# Patient Record
Sex: MALE | Race: WHITE | NOT HISPANIC OR LATINO | Employment: OTHER | URBAN - METROPOLITAN AREA
[De-identification: names, ages, dates, MRNs, and addresses within clinical notes are randomized per-mention and may not be internally consistent; named-entity substitution may affect disease eponyms.]

---

## 2017-03-28 ENCOUNTER — OFFICE VISIT (OUTPATIENT)
Dept: INTERNAL MEDICINE | Facility: CLINIC | Age: 62
End: 2017-03-28
Payer: MEDICARE

## 2017-03-28 ENCOUNTER — PATIENT MESSAGE (OUTPATIENT)
Dept: INTERNAL MEDICINE | Facility: CLINIC | Age: 62
End: 2017-03-28

## 2017-03-28 VITALS
HEART RATE: 74 BPM | WEIGHT: 257.06 LBS | OXYGEN SATURATION: 96 % | SYSTOLIC BLOOD PRESSURE: 126 MMHG | DIASTOLIC BLOOD PRESSURE: 76 MMHG | HEIGHT: 75 IN | TEMPERATURE: 98 F | BODY MASS INDEX: 31.96 KG/M2

## 2017-03-28 DIAGNOSIS — Z12.5 PROSTATE CANCER SCREENING: ICD-10-CM

## 2017-03-28 DIAGNOSIS — E78.00 HYPERCHOLESTEREMIA: ICD-10-CM

## 2017-03-28 DIAGNOSIS — M54.50 CHRONIC MIDLINE LOW BACK PAIN WITHOUT SCIATICA: Primary | ICD-10-CM

## 2017-03-28 DIAGNOSIS — R53.83 FATIGUE, UNSPECIFIED TYPE: ICD-10-CM

## 2017-03-28 DIAGNOSIS — Z00.00 ROUTINE CHECK-UP: ICD-10-CM

## 2017-03-28 DIAGNOSIS — G89.29 CHRONIC MIDLINE LOW BACK PAIN WITHOUT SCIATICA: Primary | ICD-10-CM

## 2017-03-28 PROCEDURE — 99999 PR PBB SHADOW E&M-EST. PATIENT-LVL III: CPT | Mod: PBBFAC,,, | Performed by: NURSE PRACTITIONER

## 2017-03-28 PROCEDURE — 99499 UNLISTED E&M SERVICE: CPT | Mod: S$GLB,,, | Performed by: NURSE PRACTITIONER

## 2017-03-28 PROCEDURE — 1160F RVW MEDS BY RX/DR IN RCRD: CPT | Mod: S$GLB,,, | Performed by: NURSE PRACTITIONER

## 2017-03-28 PROCEDURE — 99214 OFFICE O/P EST MOD 30 MIN: CPT | Mod: S$GLB,,, | Performed by: NURSE PRACTITIONER

## 2017-03-28 NOTE — MR AVS SNAPSHOT
[facility]  [id number] [place] [id number]  Phone: [phone]                  Juan F CORDOVA Head   3/28/2017 10:20 AM   Office Visit    Description:  Male : 1955   Provider:  Garret Daniel NP   Department:  [facility]           Reason for Visit     Follow-up           Diagnoses this Visit        Comments    Chronic midline low back pain without sciatica    -  Primary     Routine check-up         Prostate cancer screening         Fatigue, unspecified type         Hypercholesteremia                To Do List           Future Appointments        Provider Department Dept Phone    3/29/2017 9:10 AM LABORATORY, [address] [facility] [phone]    2017 10:20 AM Judah Mcdonald MD [facility] Internal Medicine [phone]      Goals (5 Years of Data)     None      [facility]     [facility] Nurse Care Line -  Assistance  Registered nurses in the [facility] provide clinical advisement, health education, appointment booking, and other advisory services.  Call for this free service at [phone].             Medications           Message regarding Medications     Verify the changes and/or additions to your medication regime listed below are the same as discussed with your clinician today.  If any of these changes or additions are incorrect, please notify your healthcare provider.        STOP taking these medications     hyoscyamine (LEVSIN/SL) 0.125 mg Subl Place 1 tablet (0.125 mg total) under the tongue daily as needed.    lurasidone 40 mg Tab tablet Take 80 mg by mouth once daily.           Verify that the below list of medications is an accurate representation of the medications you are currently taking.  If none reported, the list may be blank. If incorrect, please contact your healthcare provider. Carry this list with you in case of emergency.           Current Medications     clonazepam (KLONOPIN) 1 MG  "tablet 3 mg. 1.5 tablet Oral At bedtime    gabapentin (NEURONTIN) 800 MG tablet     oxycodone-acetaminophen  mg (PERCOCET)  mg per tablet Take 1 tablet by mouth every 4 (four) hours as needed.    trazodone (DESYREL) 150 MG tablet     ziprasidone (GEODON) 40 MG Cap Take 80 mg by mouth once daily. take with food    inhalation device (AEROCHAMBER PLUS FLOW-VU) Use as directed for inhalation.           Clinical Reference Information           Your Vitals Were     BP Pulse Temp Height Weight SpO2    126/76 74 97.7 °F (36.5 °C) (Tympanic) 6' 3" (1.905 m) 116.6 kg (257 lb 0.9 oz) 96%    BMI                32.13 kg/m2          Blood Pressure          Most Recent Value    BP  126/76      Allergies as of 3/28/2017     Aspirin      Immunizations Administered on Date of Encounter - 3/28/2017     None      Orders Placed During Today's Visit      Normal Orders This Visit    Ambulatory Referral to Neurosurgery     Future Labs/Procedures Expected by Expires    CBC auto differential  3/28/2017 5/27/2018    Comprehensive metabolic panel  3/28/2017 5/27/2018    Lipid panel  3/28/2017 5/27/2018    PSA, Screening  3/28/2017 (Approximate) 5/27/2018    Testosterone Panel  3/28/2017 (Approximate) 5/27/2018    TSH  3/28/2017 5/27/2018      Language Assistance Services     ATTENTION: Language assistance services are available, free of charge. Please call [phone].      ATENCIÓN: Si habla español, tiene a sheikh disposición servicios gratuitos de asistencia lingüística. Llame al [phone].     CHÚ Ý: N?u b?n nói Ti?ng Vi?t, có các d?ch v? h? tr? ngôn ng? mi?n phí dành cho b?n. G?i s? [phone].         [facility] complies with applicable Federal civil rights laws and does not discriminate on the basis of race, color, national origin, age, disability, or sex.        "

## 2017-03-29 ENCOUNTER — PATIENT MESSAGE (OUTPATIENT)
Dept: INTERNAL MEDICINE | Facility: CLINIC | Age: 62
End: 2017-03-29

## 2017-03-29 ENCOUNTER — LAB VISIT (OUTPATIENT)
Dept: LAB | Facility: HOSPITAL | Age: 62
End: 2017-03-29
Attending: INTERNAL MEDICINE
Payer: MEDICARE

## 2017-03-29 DIAGNOSIS — Z00.00 ROUTINE CHECK-UP: ICD-10-CM

## 2017-03-29 DIAGNOSIS — R53.83 FATIGUE, UNSPECIFIED TYPE: ICD-10-CM

## 2017-03-29 DIAGNOSIS — Z12.5 PROSTATE CANCER SCREENING: ICD-10-CM

## 2017-03-29 DIAGNOSIS — E78.00 HYPERCHOLESTEREMIA: ICD-10-CM

## 2017-03-29 LAB
ALBUMIN SERPL BCP-MCNC: 3.7 G/DL
ALP SERPL-CCNC: 98 U/L
ALT SERPL W/O P-5'-P-CCNC: 21 U/L
ANION GAP SERPL CALC-SCNC: 9 MMOL/L
AST SERPL-CCNC: 20 U/L
BASOPHILS # BLD AUTO: 0.03 K/UL
BASOPHILS NFR BLD: 0.6 %
BILIRUB SERPL-MCNC: 0.5 MG/DL
BUN SERPL-MCNC: 10 MG/DL
CALCIUM SERPL-MCNC: 9.3 MG/DL
CHLORIDE SERPL-SCNC: 104 MMOL/L
CHOLEST/HDLC SERPL: 4.8 {RATIO}
CO2 SERPL-SCNC: 29 MMOL/L
COMPLEXED PSA SERPL-MCNC: 0.39 NG/ML
CREAT SERPL-MCNC: 0.9 MG/DL
DIFFERENTIAL METHOD: NORMAL
EOSINOPHIL # BLD AUTO: 0.3 K/UL
EOSINOPHIL NFR BLD: 4.9 %
ERYTHROCYTE [DISTWIDTH] IN BLOOD BY AUTOMATED COUNT: 12.4 %
EST. GFR  (AFRICAN AMERICAN): >60 ML/MIN/1.73 M^2
EST. GFR  (NON AFRICAN AMERICAN): >60 ML/MIN/1.73 M^2
GLUCOSE SERPL-MCNC: 86 MG/DL
HCT VFR BLD AUTO: 43.8 %
HDL/CHOLESTEROL RATIO: 20.8 %
HDLC SERPL-MCNC: 197 MG/DL
HDLC SERPL-MCNC: 41 MG/DL
HGB BLD-MCNC: 14.9 G/DL
LDLC SERPL CALC-MCNC: 119.2 MG/DL
LYMPHOCYTES # BLD AUTO: 1.8 K/UL
LYMPHOCYTES NFR BLD: 34.2 %
MCH RBC QN AUTO: 30.7 PG
MCHC RBC AUTO-ENTMCNC: 34 %
MCV RBC AUTO: 90 FL
MONOCYTES # BLD AUTO: 0.4 K/UL
MONOCYTES NFR BLD: 7 %
NEUTROPHILS # BLD AUTO: 2.8 K/UL
NEUTROPHILS NFR BLD: 53.1 %
NONHDLC SERPL-MCNC: 156 MG/DL
PLATELET # BLD AUTO: 164 K/UL
PMV BLD AUTO: 12.1 FL
POTASSIUM SERPL-SCNC: 4.1 MMOL/L
PROT SERPL-MCNC: 6.9 G/DL
RBC # BLD AUTO: 4.86 M/UL
SODIUM SERPL-SCNC: 142 MMOL/L
TRIGL SERPL-MCNC: 184 MG/DL
TSH SERPL DL<=0.005 MIU/L-ACNC: 2 UIU/ML
WBC # BLD AUTO: 5.26 K/UL

## 2017-03-29 PROCEDURE — 36415 COLL VENOUS BLD VENIPUNCTURE: CPT

## 2017-03-29 PROCEDURE — 80053 COMPREHEN METABOLIC PANEL: CPT

## 2017-03-29 PROCEDURE — 84270 ASSAY OF SEX HORMONE GLOBUL: CPT

## 2017-03-29 PROCEDURE — 85025 COMPLETE CBC W/AUTO DIFF WBC: CPT

## 2017-03-29 PROCEDURE — 84443 ASSAY THYROID STIM HORMONE: CPT

## 2017-03-29 PROCEDURE — 84153 ASSAY OF PSA TOTAL: CPT

## 2017-03-29 PROCEDURE — 80061 LIPID PANEL: CPT

## 2017-03-29 RX ORDER — ESOMEPRAZOLE MAGNESIUM 40 MG/1
40 CAPSULE, DELAYED RELEASE ORAL
Qty: 30 CAPSULE | Refills: 2 | Status: SHIPPED | OUTPATIENT
Start: 2017-03-29 | End: 2017-10-24 | Stop reason: SDUPTHER

## 2017-03-29 NOTE — PROGRESS NOTES
"Subjective:      Patient ID: Juan F Flores is a 62 y.o. male.    Chief Complaint: Follow-up    HPI:  Patient states he has a hx of back surgery, is taking pain meds, not helping anymore.  He says he still needs some work done on his back, is looking for a referral for a neurosurgeon.  He would like to see Dr Mike at the [facility] in [place].  He is also due for a physical with his pcp, dr correa.  He is not fasting at this time    Past Medical History:   Diagnosis Date    Anxiety     Bipolar disorder     Chronic LBP     Depression     Difficulty urinating     DU (duodenal ulcer)     Irritable bowel syndrome        Past Surgical History:   Procedure Laterality Date    BACK SURGERY      BACK SURGERY      hernia      HERNIA REPAIR      LUMBAR FUSION      lyph node biopsy         Lab Results   Component Value Date    WBC 6.75 09/27/2016    HGB 14.9 09/27/2016    HCT 43.2 09/27/2016     09/27/2016    CHOL 158 08/09/2016    TRIG 176 (H) 08/09/2016    HDL 32 (L) 08/09/2016    ALT 18 08/09/2016    AST 29 08/09/2016     09/27/2016    K 4.3 09/27/2016     09/27/2016    CREATININE 0.9 09/27/2016    BUN 10 09/27/2016    CO2 25 09/27/2016    TSH 1.128 01/11/2016    PSA 0.37 01/11/2016    INR 1.0 09/27/2016       /76  Pulse 74  Temp 97.7 °F (36.5 °C) (Tympanic)   Ht 6' 3" (1.905 m)  Wt 116.6 kg (257 lb 0.9 oz)  SpO2 96%  BMI 32.13 kg/m2      Review of Systems   Constitutional: Negative for appetite change, chills, diaphoresis and fever.   HENT: Negative for congestion, ear pain, postnasal drip, rhinorrhea, sneezing, sore throat and trouble swallowing.    Eyes: Negative for photophobia, pain and visual disturbance.   Respiratory: Negative for apnea, cough, choking, chest tightness, shortness of breath and wheezing.    Cardiovascular: Negative for chest pain, palpitations and leg swelling.   Gastrointestinal: Negative for abdominal pain, constipation, diarrhea, nausea and " vomiting.   Genitourinary: Negative for decreased urine volume, difficulty urinating, dysuria, hematuria and urgency.   Musculoskeletal: Positive for back pain. Negative for arthralgias, gait problem, joint swelling and myalgias.   Skin: Negative for rash.   Neurological: Negative for dizziness, tremors, seizures, syncope, weakness, light-headedness, numbness and headaches.   Psychiatric/Behavioral: Negative for agitation, confusion, decreased concentration, hallucinations and sleep disturbance. The patient is not nervous/anxious.       Objective:     Physical Exam   Constitutional: He is oriented to person, place, and time. He appears well-developed and well-nourished. No distress.   Musculoskeletal:   Normal gait   Neurological: He is alert and oriented to person, place, and time.   Skin: Skin is warm and dry.   Psychiatric: He has a normal mood and affect. His behavior is normal.     Assessment:      1. Chronic midline low back pain without sciatica    2. Routine check-up    3. Prostate cancer screening    4. Fatigue, unspecified type    5. Hypercholesteremia      Plan:   Chronic midline low back pain without sciatica  -     Ambulatory Referral to Neurosurgery    Routine check-up  -     TSH; Future; Expected date: 3/28/17  -     CBC auto differential; Future; Expected date: 3/28/17  -     Lipid panel; Future; Expected date: 3/28/17  -     Comprehensive metabolic panel; Future; Expected date: 3/28/17  -     PSA, Screening; Future; Expected date: 3/28/17    Prostate cancer screening  -     PSA, Screening; Future; Expected date: 3/28/17    Fatigue, unspecified type  -     TSH; Future; Expected date: 3/28/17  -     CBC auto differential; Future; Expected date: 3/28/17  -     Testosterone Panel; Future; Expected date: 3/28/17    Hypercholesteremia  -     TSH; Future; Expected date: 3/28/17  -     Lipid panel; Future; Expected date: 3/28/17    referral sent in to neuromedical.   Will see dr correa for labs and a  physical      Current Outpatient Prescriptions:     clonazepam (KLONOPIN) 1 MG tablet, 3 mg. 1.5 tablet Oral At bedtime, Disp: , Rfl:     gabapentin (NEURONTIN) 800 MG tablet, , Disp: , Rfl: 0    oxycodone-acetaminophen  mg (PERCOCET)  mg per tablet, Take 1 tablet by mouth every 4 (four) hours as needed., Disp: , Rfl:     trazodone (DESYREL) 150 MG tablet, , Disp: , Rfl: 0    ziprasidone (GEODON) 40 MG Cap, Take 80 mg by mouth once daily. take with food, Disp: , Rfl: 0    inhalation device (AEROCHAMBER PLUS FLOW-VU), Use as directed for inhalation., Disp: 1 Device, Rfl: 0

## 2017-04-02 LAB
ALBUMIN SERPL-MCNC: 4.3 G/DL (ref 3.6–5.1)
SHBG SERPL-SCNC: 79 NMOL/L (ref 22–77)
TESTOST FREE SERPL-MCNC: 13.7 PG/ML (ref 46–224)
TESTOST SERPL-MCNC: 234 NG/DL (ref 250–1100)
TESTOSTERONE.FREE+WB SERPL-MCNC: 27 NG/DL (ref 110–575)

## 2017-04-13 ENCOUNTER — OFFICE VISIT (OUTPATIENT)
Dept: INTERNAL MEDICINE | Facility: CLINIC | Age: 62
End: 2017-04-13
Payer: MEDICARE

## 2017-04-13 VITALS
OXYGEN SATURATION: 96 % | WEIGHT: 263.25 LBS | HEART RATE: 90 BPM | SYSTOLIC BLOOD PRESSURE: 102 MMHG | HEIGHT: 75 IN | TEMPERATURE: 96 F | DIASTOLIC BLOOD PRESSURE: 74 MMHG | BODY MASS INDEX: 32.73 KG/M2

## 2017-04-13 DIAGNOSIS — F31.9 BIPOLAR DEPRESSION: ICD-10-CM

## 2017-04-13 DIAGNOSIS — E29.1 HYPOGONADISM MALE: ICD-10-CM

## 2017-04-13 DIAGNOSIS — F41.9 ANXIETY: ICD-10-CM

## 2017-04-13 DIAGNOSIS — Z00.00 ROUTINE GENERAL MEDICAL EXAMINATION AT A HEALTH CARE FACILITY: Primary | ICD-10-CM

## 2017-04-13 PROCEDURE — 96372 THER/PROPH/DIAG INJ SC/IM: CPT | Mod: S$GLB,,, | Performed by: INTERNAL MEDICINE

## 2017-04-13 PROCEDURE — 99396 PREV VISIT EST AGE 40-64: CPT | Mod: 25,S$GLB,, | Performed by: INTERNAL MEDICINE

## 2017-04-13 PROCEDURE — 99999 PR PBB SHADOW E&M-EST. PATIENT-LVL III: CPT | Mod: PBBFAC,,, | Performed by: INTERNAL MEDICINE

## 2017-04-13 PROCEDURE — 99499 UNLISTED E&M SERVICE: CPT | Mod: S$GLB,,, | Performed by: INTERNAL MEDICINE

## 2017-04-13 RX ORDER — TESTOSTERONE CYPIONATE 200 MG/ML
100 INJECTION, SOLUTION INTRAMUSCULAR
Status: DISCONTINUED | OUTPATIENT
Start: 2017-04-13 | End: 2017-04-27

## 2017-04-13 RX ORDER — MELOXICAM 7.5 MG/1
7.5 TABLET ORAL DAILY
COMMUNITY
End: 2017-07-14 | Stop reason: SDUPTHER

## 2017-04-13 RX ADMIN — TESTOSTERONE CYPIONATE 100 MG: 200 INJECTION, SOLUTION INTRAMUSCULAR at 10:04

## 2017-04-13 NOTE — MR AVS SNAPSHOT
[facility] Internal Medicine  [address] [facility] [address]  Phone: [phone]                  Juan F CORDOVA Head   2017 10:20 AM   Office Visit    Description:  Male : 1955   Provider:  Judah Mcdonald MD   Department:  [facility]           Reason for Visit     Annual Exam           Diagnoses this Visit        Comments    Routine general medical examination at a health care facility    -  Primary     Hypogonadism male         Bipolar depression         Anxiety                To Do List           Future Appointments        Provider Department Dept Phone    2017 3:50 PM INTERNAL MEDICINE NURSE, [facility] Internal [facility] [phone]    2017 3:00 PM Judah Mcdonald MD [facility] Internal [facility] [phone]      Goals (5 Years of Data)     None      [facility]     [facility] On Call Nurse Care Line -  Assistance  Unless otherwise directed by your provider, please contact [facility], our nurse care line that is available for  assistance.     Registered nurses in the [facility] On Call Center provide: appointment scheduling, clinical advisement, health education, and other advisory services.  Call: [phone] (toll free)               Medications           Message regarding Medications     Verify the changes and/or additions to your medication regime listed below are the same as discussed with your clinician today.  If any of these changes or additions are incorrect, please notify your healthcare provider.        These medications were administered today        Dose Freq    testosterone cypionate injection 100 mg 100 mg Every 14 days    Sig: Inject 0.5 mLs (100 mg total) into the muscle every 14 (fourteen) days.    Class: Normal    Route: Intramuscular           Verify that the below list of medications is an accurate representation of the medications you are currently taking.  If none reported, the list may be blank. If incorrect, please  "contact your healthcare provider. Carry this list with you in case of emergency.           Current Medications     clonazepam (KLONOPIN) 1 MG tablet 3 mg. 1.5 tablet Oral At bedtime    esomeprazole (NEXIUM) 40 MG capsule Take 1 capsule (40 mg total) by mouth before breakfast.    gabapentin (NEURONTIN) 800 MG tablet     inhalation device (AEROCHAMBER PLUS FLOW-VU) Use as directed for inhalation.    meloxicam (MOBIC) 7.5 MG tablet Take 7.5 mg by mouth once daily.    oxycodone-acetaminophen  mg (PERCOCET)  mg per tablet Take 1 tablet by mouth every 4 (four) hours as needed.    trazodone (DESYREL) 150 MG tablet     ziprasidone (GEODON) 40 MG Cap Take 80 mg by mouth once daily. take with food           Clinical Reference Information           Your Vitals Were     BP Pulse Temp Height Weight SpO2    102/74 90 96.1 °F (35.6 °C) (Tympanic) 6' 3" (1.905 m) 119.4 kg (263 lb 3.7 oz) 96%    BMI                32.9 kg/m2          Blood Pressure          Most Recent Value    BP  102/74      Allergies as of 4/13/2017     Aspirin      Immunizations Administered on Date of Encounter - 4/13/2017     None      Orders Placed During Today's Visit     Future Labs/Procedures Expected by Expires    Testosterone Panel  7/12/2017 (Approximate) 10/10/2017      Administrations This Visit     testosterone cypionate injection 100 mg     Admin Date Action Dose Route Administered By             04/13/2017 Given 100 mg Intramuscular Clementina Brown LPN                      Language Assistance Services     ATTENTION: Language assistance services are available, free of charge. Please call [phone].      ATENCIÓN: Si habla español, tiene a sheikh disposición servicios gratuitos de asistencia lingüística. Llame al [phone].     CHÚ Ý: N?u b?n nói Ti?ng Vi?t, có các d?ch v? h? tr? ngôn ng? mi?n phí dành cho b?n. G?i s? [phone].         [facility] complies with applicable Federal civil rights laws and does not " discriminate on the basis of race, color, national origin, age, disability, or sex.

## 2017-04-13 NOTE — PROGRESS NOTES
"HPI:  Patient is a 62-year-old man who comes in today for annual physical exam.  He complains of feeling fatigued, tired down.  He has a history of hypogonadism and 17.  Testosterone in the past but it's been many years since she's been on it.  He would like to restart.  He has no other particular complaints.  He does see a psychiatrist for depression and bipolar disorder.      Current MEDS: medcard review, verified and update  Allergies: Per the electronic medical record    Past Medical History:   Diagnosis Date    Anxiety     Bipolar disorder     Chronic LBP     Depression     Difficulty urinating     DU (duodenal ulcer)     Hypogonadism male     Irritable bowel syndrome        Past Surgical History:   Procedure Laterality Date    BACK SURGERY      BACK SURGERY      hernia      HERNIA REPAIR      LUMBAR FUSION      lyph node biopsy         SHx: per the electronic medical record    FHx: recorded in the electronic medical record    ROS:    denies any chest pains or shortness of breath. Denies any nausea, vomiting or diarrhea. Denies any fever, chills or sweats. Denies any change in weight, voice, stool, skin or hair. Denies any dysuria, dyspepsia or dysphagia. Denies any change in vision, hearing or headaches. Denies any swollen lymph nodes or loss of memory.    PE:  /74  Pulse 90  Temp 96.1 °F (35.6 °C) (Tympanic)   Ht 6' 3" (1.905 m)  Wt 119.4 kg (263 lb 3.7 oz)  SpO2 96%  BMI 32.9 kg/m2  Gen: Well-developed, well-nourished, male, in no acute distress, oriented x3  HEENT: neck is supple, no adenopathy, carotids 2+ equal without bruits, thyroid exam normal size without nodules.  CHEST: clear to auscultation and percussion  CVS: regular rate and rhythm without significant murmur, gallop, or rubs  ABD: soft, benign, no rebound no guarding, no distention.  Bowel sounds are normal.     nontender.  No palpable masses.  No organomegaly and no audible bruits.  RECTAL: Deferred  EXT: no clubbing, " cyanosis, or edema  LYMPH: no cervical, inguinal, or axillary adenopathy  FEET: no loss of sensation.  No ulcers or pressure sores.  NEURO: gait normal.  Cranial nerves II- XII intact. No nystagmus.  Speech normal.   Gross motor and sensory unremarkable.    Lab Results   Component Value Date    WBC 5.26 03/29/2017    HGB 14.9 03/29/2017    HCT 43.8 03/29/2017     03/29/2017    CHOL 197 03/29/2017    TRIG 184 (H) 03/29/2017    HDL 41 03/29/2017    ALT 21 03/29/2017    AST 20 03/29/2017     03/29/2017    K 4.1 03/29/2017     03/29/2017    CREATININE 0.9 03/29/2017    BUN 10 03/29/2017    CO2 29 03/29/2017    TSH 2.003 03/29/2017    PSA 0.39 03/29/2017    INR 1.0 09/27/2016    testosterone levels were extremely low    Impression:  Hypergonadism  Other medical problems below, stable  Patient Active Problem List   Diagnosis    DU (duodenal ulcer)    Irritable bowel syndrome    Anxiety    Depression    Chronic low back pain    Osteoarthritis    Bipolar depression    Hypogonadism male       Plan:   Orders Placed This Encounter    Testosterone Panel    testosterone cypionate injection 100 mg     He was started on double testosterone 100 mg every 2 weeks.  He'll have repeat testosterone levels in 3 months.

## 2017-04-13 NOTE — PROGRESS NOTES
Administered Depotestosterone 100mg/0.5 ml IM Left Ventrogluteal per Dr Mcdonald. Advised remain in lobby 15 min to monitor for adverse reaction. Discarded 0.25 ml Depotestosterone. Witnessed by Filomena Ann LPN. Tolerated all/TGD

## 2017-04-27 ENCOUNTER — TELEPHONE (OUTPATIENT)
Dept: INTERNAL MEDICINE | Facility: CLINIC | Age: 62
End: 2017-04-27

## 2017-04-27 ENCOUNTER — CLINICAL SUPPORT (OUTPATIENT)
Dept: INTERNAL MEDICINE | Facility: CLINIC | Age: 62
End: 2017-04-27
Payer: MEDICARE

## 2017-04-27 DIAGNOSIS — E29.1 HYPOGONADISM MALE: Primary | ICD-10-CM

## 2017-04-27 PROCEDURE — 96372 THER/PROPH/DIAG INJ SC/IM: CPT | Mod: S$GLB,,, | Performed by: INTERNAL MEDICINE

## 2017-04-27 RX ORDER — TESTOSTERONE CYPIONATE 1000 MG/10ML
100 INJECTION, SOLUTION INTRAMUSCULAR
Qty: 10 ML | Refills: 0 | Status: SHIPPED | OUTPATIENT
Start: 2017-04-27 | End: 2017-07-19 | Stop reason: SDUPTHER

## 2017-04-27 RX ORDER — SYRINGE W-NEEDLE,DISPOSAB,3 ML 25GX5/8"
SYRINGE, EMPTY DISPOSABLE MISCELLANEOUS
Qty: 10 SYRINGE | Refills: 1 | COMMUNITY
Start: 2017-04-27

## 2017-04-27 RX ADMIN — TESTOSTERONE CYPIONATE 100 MG: 200 INJECTION, SOLUTION INTRAMUSCULAR at 04:04

## 2017-04-27 NOTE — TELEPHONE ENCOUNTER
Pt here for testosterone  Injection.  Pt requests that his wife be shown how to give the injections.  Teaching given to pt's wife with return demonstrations by pt's wife.  Pt would like to have refill for medication sent to his pharmacy for home injections.  Please advise.

## 2017-04-27 NOTE — PROGRESS NOTES
Depotestosterone 100 mg/0.5ml IM left ventrogluteal.  0.5m/l wasted and witnessed by MIRI Christy.  Pt requested teaching for his wife to give him the injections.  Pt teaching given to his wife with return demonstration by his wife. Teaching on how to draw medication from vial,how to read syringe for the correct dose and to change the needle to give the injection.  Teaching to find the correct muscle to give the injection.  Pt's wife did return demonstration on finding the muscle for injection.  Teaching on how to push the air out of the syringe and cleaning of the injection site and injection of the medication.  Pt's wife gave return demonstration and gave the pt the injection.  Pt tolerated injection well.  Pt advised to wait in clinic 15 minutes to monitor for side effects.  Pt voiced understanding and tolerated injection well.  Information given to pt and wife on side effects and if any occur [facility].

## 2017-05-01 ENCOUNTER — PATIENT MESSAGE (OUTPATIENT)
Dept: INTERNAL MEDICINE | Facility: CLINIC | Age: 62
End: 2017-05-01

## 2017-05-02 NOTE — TELEPHONE ENCOUNTER
Called pt he is needing 40 needles and 20 syringes to administer the testosterone at home.  Please order.

## 2017-05-02 NOTE — TELEPHONE ENCOUNTER
----- Message from Rita Nguyen sent at 5/2/2017  8:07 AM CDT -----  Call pt at [phone]///Returning your call//valdo xiong

## 2017-05-03 RX ORDER — SYRINGE, DISPOSABLE, 3 ML
SYRINGE, EMPTY DISPOSABLE MISCELLANEOUS
Qty: 20 EACH | Refills: 1 | COMMUNITY
Start: 2017-05-03 | End: 2017-07-31 | Stop reason: SDUPTHER

## 2017-05-03 NOTE — TELEPHONE ENCOUNTER
----- Message from Lubna Dewey sent at 5/3/2017  9:14 AM CDT -----  Contact: [place] - [facility]  She would like for you to call her back regarding a medication.   This is all she would tell me.  Call her at [phone]. bah

## 2017-07-14 ENCOUNTER — OFFICE VISIT (OUTPATIENT)
Dept: INTERNAL MEDICINE | Facility: CLINIC | Age: 62
End: 2017-07-14
Payer: MEDICARE

## 2017-07-14 ENCOUNTER — LAB VISIT (OUTPATIENT)
Dept: LAB | Facility: HOSPITAL | Age: 62
End: 2017-07-14
Attending: INTERNAL MEDICINE
Payer: MEDICARE

## 2017-07-14 VITALS
BODY MASS INDEX: 31.33 KG/M2 | DIASTOLIC BLOOD PRESSURE: 80 MMHG | HEART RATE: 73 BPM | HEIGHT: 75 IN | SYSTOLIC BLOOD PRESSURE: 110 MMHG | WEIGHT: 252 LBS | TEMPERATURE: 99 F | OXYGEN SATURATION: 94 %

## 2017-07-14 DIAGNOSIS — Z00.00 ROUTINE GENERAL MEDICAL EXAMINATION AT A HEALTH CARE FACILITY: ICD-10-CM

## 2017-07-14 DIAGNOSIS — E29.1 HYPOGONADISM MALE: ICD-10-CM

## 2017-07-14 DIAGNOSIS — E29.1 HYPOGONADISM MALE: Primary | ICD-10-CM

## 2017-07-14 PROCEDURE — 84270 ASSAY OF SEX HORMONE GLOBUL: CPT

## 2017-07-14 PROCEDURE — 36415 COLL VENOUS BLD VENIPUNCTURE: CPT | Mod: PO

## 2017-07-14 PROCEDURE — 99213 OFFICE O/P EST LOW 20 MIN: CPT | Mod: S$GLB,,, | Performed by: INTERNAL MEDICINE

## 2017-07-14 PROCEDURE — 99999 PR PBB SHADOW E&M-EST. PATIENT-LVL III: CPT | Mod: PBBFAC,,, | Performed by: INTERNAL MEDICINE

## 2017-07-14 RX ORDER — MELOXICAM 7.5 MG/1
7.5 TABLET ORAL DAILY
Qty: 30 TABLET | Refills: 11 | Status: SHIPPED | OUTPATIENT
Start: 2017-07-14 | End: 2018-03-19

## 2017-07-14 NOTE — PROGRESS NOTES
"HPI:  Patient is a 62-year-old man who comes today for follow-up of his hypogonadism.  He's now been on testosterone shots about 3 months.  Unfortunately, he did not get his testosterone blood work done prior to this appointment.  His last shot was 8 days ago.    Current meds have been verified and updated per the EMR  Exam:/80   Pulse 73   Temp 99.4 °F (37.4 °C) (Tympanic)   Ht 6' 3" (1.905 m)   Wt 114.3 kg (251 lb 15.8 oz)   SpO2 (!) 94%   BMI 31.50 kg/m²   Exam deferred    Lab Results   Component Value Date    WBC 5.26 03/29/2017    HGB 14.9 03/29/2017    HCT 43.8 03/29/2017     03/29/2017    CHOL 197 03/29/2017    TRIG 184 (H) 03/29/2017    HDL 41 03/29/2017    ALT 21 03/29/2017    AST 20 03/29/2017     03/29/2017    K 4.1 03/29/2017     03/29/2017    CREATININE 0.9 03/29/2017    BUN 10 03/29/2017    CO2 29 03/29/2017    TSH 2.003 03/29/2017    PSA 0.39 03/29/2017    INR 1.0 09/27/2016       Impression:  Hypergonadism  Patient Active Problem List   Diagnosis    DU (duodenal ulcer)    Irritable bowel syndrome    Anxiety    Depression    Chronic low back pain    Osteoarthritis    Bipolar depression    Hypogonadism male       Plan:  Orders Placed This Encounter    Comprehensive metabolic panel    Testosterone Panel    CBC auto differential    meloxicam (MOBIC) 7.5 MG tablet     He'll get the testosterone levels done today.  He will see me again in 3 months with the above lab work.    "

## 2017-07-16 ENCOUNTER — PATIENT MESSAGE (OUTPATIENT)
Dept: INTERNAL MEDICINE | Facility: CLINIC | Age: 62
End: 2017-07-16

## 2017-07-19 ENCOUNTER — TELEPHONE (OUTPATIENT)
Dept: INTERNAL MEDICINE | Facility: CLINIC | Age: 62
End: 2017-07-19

## 2017-07-19 DIAGNOSIS — E29.1 HYPOGONADISM MALE: Primary | ICD-10-CM

## 2017-07-19 LAB
ALBUMIN SERPL-MCNC: 4.1 G/DL (ref 3.6–5.1)
SHBG SERPL-SCNC: 70 NMOL/L (ref 22–77)
TESTOST FREE SERPL-MCNC: 20.4 PG/ML (ref 46–224)
TESTOST SERPL-MCNC: 306 NG/DL (ref 250–1100)
TESTOSTERONE.FREE+WB SERPL-MCNC: 38.5 NG/DL (ref 110–575)

## 2017-07-19 RX ORDER — TESTOSTERONE CYPIONATE 1000 MG/10ML
200 INJECTION, SOLUTION INTRAMUSCULAR
Qty: 10 ML | Refills: 0
Start: 2017-07-19 | End: 2017-07-21 | Stop reason: SDUPTHER

## 2017-07-19 NOTE — TELEPHONE ENCOUNTER
Called pt informed him of medication change and repeat labs. Informed pt new prescription sent to his pharmacy. Pt voiced understanding.

## 2017-07-20 ENCOUNTER — PATIENT MESSAGE (OUTPATIENT)
Dept: INTERNAL MEDICINE | Facility: CLINIC | Age: 62
End: 2017-07-20

## 2017-07-21 RX ORDER — TESTOSTERONE CYPIONATE 1000 MG/10ML
200 INJECTION, SOLUTION INTRAMUSCULAR
Qty: 10 ML | Refills: 0 | Status: SHIPPED | OUTPATIENT
Start: 2017-07-21 | End: 2017-10-24

## 2017-07-30 ENCOUNTER — PATIENT MESSAGE (OUTPATIENT)
Dept: INTERNAL MEDICINE | Facility: CLINIC | Age: 62
End: 2017-07-30

## 2017-07-31 RX ORDER — SYRINGE, DISPOSABLE, 3 ML
SYRINGE, EMPTY DISPOSABLE MISCELLANEOUS
Qty: 20 EACH | Refills: 1 | COMMUNITY
Start: 2017-07-31

## 2017-10-16 ENCOUNTER — LAB VISIT (OUTPATIENT)
Dept: LAB | Facility: HOSPITAL | Age: 62
End: 2017-10-16
Attending: INTERNAL MEDICINE
Payer: MEDICARE

## 2017-10-16 DIAGNOSIS — Z00.00 ROUTINE GENERAL MEDICAL EXAMINATION AT A HEALTH CARE FACILITY: ICD-10-CM

## 2017-10-16 DIAGNOSIS — E29.1 HYPOGONADISM MALE: ICD-10-CM

## 2017-10-16 LAB
ALBUMIN SERPL BCP-MCNC: 3.8 G/DL
ALP SERPL-CCNC: 102 U/L
ALT SERPL W/O P-5'-P-CCNC: 21 U/L
ANION GAP SERPL CALC-SCNC: 5 MMOL/L
AST SERPL-CCNC: 20 U/L
BASOPHILS # BLD AUTO: 0.03 K/UL
BASOPHILS NFR BLD: 0.5 %
BILIRUB SERPL-MCNC: 0.7 MG/DL
BUN SERPL-MCNC: 12 MG/DL
CALCIUM SERPL-MCNC: 9.3 MG/DL
CHLORIDE SERPL-SCNC: 105 MMOL/L
CO2 SERPL-SCNC: 30 MMOL/L
CREAT SERPL-MCNC: 1 MG/DL
DIFFERENTIAL METHOD: ABNORMAL
EOSINOPHIL # BLD AUTO: 0.2 K/UL
EOSINOPHIL NFR BLD: 2.4 %
ERYTHROCYTE [DISTWIDTH] IN BLOOD BY AUTOMATED COUNT: 12.5 %
EST. GFR  (AFRICAN AMERICAN): >60 ML/MIN/1.73 M^2
EST. GFR  (NON AFRICAN AMERICAN): >60 ML/MIN/1.73 M^2
GLUCOSE SERPL-MCNC: 91 MG/DL
HCT VFR BLD AUTO: 53.6 %
HGB BLD-MCNC: 17.8 G/DL
IMM GRANULOCYTES # BLD AUTO: 0.01 K/UL
IMM GRANULOCYTES NFR BLD AUTO: 0.2 %
LYMPHOCYTES # BLD AUTO: 1.4 K/UL
LYMPHOCYTES NFR BLD: 21.4 %
MCH RBC QN AUTO: 31.3 PG
MCHC RBC AUTO-ENTMCNC: 33.2 G/DL
MCV RBC AUTO: 94 FL
MONOCYTES # BLD AUTO: 0.5 K/UL
MONOCYTES NFR BLD: 7.4 %
NEUTROPHILS # BLD AUTO: 4.5 K/UL
NEUTROPHILS NFR BLD: 68.1 %
NRBC BLD-RTO: 0 /100 WBC
PLATELET # BLD AUTO: 164 K/UL
PMV BLD AUTO: 12.4 FL
POTASSIUM SERPL-SCNC: 4.8 MMOL/L
PROT SERPL-MCNC: 7.1 G/DL
RBC # BLD AUTO: 5.69 M/UL
SODIUM SERPL-SCNC: 140 MMOL/L
WBC # BLD AUTO: 6.64 K/UL

## 2017-10-16 PROCEDURE — 84270 ASSAY OF SEX HORMONE GLOBUL: CPT

## 2017-10-16 PROCEDURE — 85025 COMPLETE CBC W/AUTO DIFF WBC: CPT

## 2017-10-16 PROCEDURE — 80053 COMPREHEN METABOLIC PANEL: CPT

## 2017-10-16 PROCEDURE — 36415 COLL VENOUS BLD VENIPUNCTURE: CPT | Mod: PO

## 2017-10-20 LAB
ALBUMIN SERPL-MCNC: 4.3 G/DL (ref 3.6–5.1)
SHBG SERPL-SCNC: 70 NMOL/L (ref 22–77)
TESTOST FREE SERPL-MCNC: 98.7 PG/ML (ref 46–224)
TESTOST SERPL-MCNC: 1224 NG/DL (ref 250–1100)
TESTOSTERONE.FREE+WB SERPL-MCNC: 194.4 NG/DL (ref 110–575)

## 2017-10-24 ENCOUNTER — OFFICE VISIT (OUTPATIENT)
Dept: INTERNAL MEDICINE | Facility: CLINIC | Age: 62
End: 2017-10-24
Payer: MEDICARE

## 2017-10-24 VITALS
TEMPERATURE: 98 F | BODY MASS INDEX: 30.48 KG/M2 | HEIGHT: 75 IN | SYSTOLIC BLOOD PRESSURE: 106 MMHG | OXYGEN SATURATION: 96 % | WEIGHT: 245.13 LBS | HEART RATE: 61 BPM | DIASTOLIC BLOOD PRESSURE: 68 MMHG

## 2017-10-24 DIAGNOSIS — G89.29 CHRONIC MIDLINE LOW BACK PAIN WITHOUT SCIATICA: ICD-10-CM

## 2017-10-24 DIAGNOSIS — E29.1 HYPOGONADISM MALE: Primary | ICD-10-CM

## 2017-10-24 DIAGNOSIS — M54.50 CHRONIC MIDLINE LOW BACK PAIN WITHOUT SCIATICA: ICD-10-CM

## 2017-10-24 DIAGNOSIS — F32.A DEPRESSION, UNSPECIFIED DEPRESSION TYPE: ICD-10-CM

## 2017-10-24 PROCEDURE — 99499 UNLISTED E&M SERVICE: CPT | Mod: S$GLB,,, | Performed by: INTERNAL MEDICINE

## 2017-10-24 PROCEDURE — G0008 ADMIN INFLUENZA VIRUS VAC: HCPCS | Mod: S$GLB,,, | Performed by: INTERNAL MEDICINE

## 2017-10-24 PROCEDURE — G0009 ADMIN PNEUMOCOCCAL VACCINE: HCPCS | Mod: S$GLB,,, | Performed by: INTERNAL MEDICINE

## 2017-10-24 PROCEDURE — 99999 PR PBB SHADOW E&M-EST. PATIENT-LVL III: CPT | Mod: PBBFAC,,, | Performed by: INTERNAL MEDICINE

## 2017-10-24 PROCEDURE — 99213 OFFICE O/P EST LOW 20 MIN: CPT | Mod: 25,S$GLB,, | Performed by: INTERNAL MEDICINE

## 2017-10-24 PROCEDURE — 90662 IIV NO PRSV INCREASED AG IM: CPT | Mod: S$GLB,,, | Performed by: INTERNAL MEDICINE

## 2017-10-24 PROCEDURE — 90670 PCV13 VACCINE IM: CPT | Mod: S$GLB,,, | Performed by: INTERNAL MEDICINE

## 2017-10-24 RX ORDER — ESOMEPRAZOLE MAGNESIUM 40 MG/1
40 CAPSULE, DELAYED RELEASE ORAL
Qty: 30 CAPSULE | Refills: 11 | Status: SHIPPED | OUTPATIENT
Start: 2017-10-24 | End: 2018-11-07 | Stop reason: SDUPTHER

## 2017-10-24 RX ORDER — CYCLOBENZAPRINE HCL 10 MG
10 TABLET ORAL 3 TIMES DAILY PRN
Qty: 90 TABLET | Refills: 3 | Status: SHIPPED | OUTPATIENT
Start: 2017-10-24 | End: 2017-10-30

## 2017-10-24 NOTE — PROGRESS NOTES
"HPI:  Patient is a 62-year-old man who comes in today for follow-up of his hypergonadism.  He's been on testosterone for about 3 months.  He states he has not felt any difference at all.  His testosterone levels were slightly elevated.  His hematocrit also had increased significantly.  He himself is decided to stop the medication.  Since is not doing anything for    Current meds have been verified and updated per the EMR  Exam:/68 (BP Location: Left arm)   Pulse 61   Temp 98.4 °F (36.9 °C) (Tympanic)   Ht 6' 3" (1.905 m)   Wt 111.2 kg (245 lb 2.4 oz)   SpO2 96%   BMI 30.64 kg/m²   Exam deferred    Lab Results   Component Value Date    WBC 6.64 10/16/2017    HGB 17.8 10/16/2017    HCT 53.6 10/16/2017     10/16/2017    CHOL 197 03/29/2017    TRIG 184 (H) 03/29/2017    HDL 41 03/29/2017    ALT 21 10/16/2017    AST 20 10/16/2017     10/16/2017    K 4.8 10/16/2017     10/16/2017    CREATININE 1.0 10/16/2017    BUN 12 10/16/2017    CO2 30 (H) 10/16/2017    TSH 2.003 03/29/2017    PSA 0.39 03/29/2017    INR 1.0 09/27/2016       Impression:  Multiple medical problems below, stable  Patient Active Problem List   Diagnosis    DU (duodenal ulcer)    Irritable bowel syndrome    Anxiety    Depression    Chronic low back pain    Osteoarthritis    Bipolar depression    Hypogonadism male       Plan:  Orders Placed This Encounter    (In Office Administered) Pneumococcal Conjugate Vaccine (13 Valent) (IM)    Influenza - High Dose (65+) (PF) (IM)    esomeprazole (NEXIUM) 40 MG capsule    cyclobenzaprine (FLEXERIL) 10 MG tablet     He was given Prevnar vaccine today.  He is also given high-dose influenza vaccine.  Patient will see me in 6 months for his regular physical.    "

## 2017-10-30 ENCOUNTER — PATIENT MESSAGE (OUTPATIENT)
Dept: INTERNAL MEDICINE | Facility: CLINIC | Age: 62
End: 2017-10-30

## 2017-10-30 RX ORDER — TIZANIDINE HYDROCHLORIDE 6 MG/1
6 CAPSULE, GELATIN COATED ORAL NIGHTLY
Qty: 30 CAPSULE | Refills: 5 | Status: SHIPPED | OUTPATIENT
Start: 2017-10-30 | End: 2017-11-09

## 2017-10-31 ENCOUNTER — PATIENT MESSAGE (OUTPATIENT)
Dept: INTERNAL MEDICINE | Facility: CLINIC | Age: 62
End: 2017-10-31

## 2017-10-31 NOTE — TELEPHONE ENCOUNTER
He was given tizanidine 6 mg not flexeril. He wants to know he can double the dose. Please advise.

## 2018-03-13 ENCOUNTER — PES CALL (OUTPATIENT)
Dept: ADMINISTRATIVE | Facility: CLINIC | Age: 63
End: 2018-03-13

## 2018-03-19 ENCOUNTER — OFFICE VISIT (OUTPATIENT)
Dept: INTERNAL MEDICINE | Facility: CLINIC | Age: 63
End: 2018-03-19
Payer: MEDICARE

## 2018-03-19 VITALS
OXYGEN SATURATION: 95 % | HEIGHT: 75 IN | HEART RATE: 70 BPM | DIASTOLIC BLOOD PRESSURE: 64 MMHG | BODY MASS INDEX: 28.75 KG/M2 | WEIGHT: 231.25 LBS | SYSTOLIC BLOOD PRESSURE: 118 MMHG | TEMPERATURE: 98 F

## 2018-03-19 DIAGNOSIS — F31.9 BIPOLAR DEPRESSION: ICD-10-CM

## 2018-03-19 DIAGNOSIS — K58.9 IRRITABLE BOWEL SYNDROME, UNSPECIFIED TYPE: ICD-10-CM

## 2018-03-19 DIAGNOSIS — M19.90 OSTEOARTHRITIS, UNSPECIFIED OSTEOARTHRITIS TYPE, UNSPECIFIED SITE: ICD-10-CM

## 2018-03-19 DIAGNOSIS — F33.41 RECURRENT MAJOR DEPRESSIVE DISORDER, IN PARTIAL REMISSION: ICD-10-CM

## 2018-03-19 DIAGNOSIS — I77.1 TORTUOUS AORTA: ICD-10-CM

## 2018-03-19 DIAGNOSIS — E29.1 HYPOGONADISM MALE: ICD-10-CM

## 2018-03-19 DIAGNOSIS — M54.50 CHRONIC MIDLINE LOW BACK PAIN WITHOUT SCIATICA: ICD-10-CM

## 2018-03-19 DIAGNOSIS — Z00.00 ENCOUNTER FOR PREVENTIVE HEALTH EXAMINATION: Primary | ICD-10-CM

## 2018-03-19 DIAGNOSIS — F41.9 ANXIETY: ICD-10-CM

## 2018-03-19 DIAGNOSIS — G89.29 CHRONIC MIDLINE LOW BACK PAIN WITHOUT SCIATICA: ICD-10-CM

## 2018-03-19 PROBLEM — F32.4 MAJOR DEPRESSION IN PARTIAL REMISSION: Status: ACTIVE | Noted: 2018-03-19

## 2018-03-19 PROCEDURE — 99499 UNLISTED E&M SERVICE: CPT | Mod: S$GLB,,, | Performed by: NURSE PRACTITIONER

## 2018-03-19 PROCEDURE — 99999 PR PBB SHADOW E&M-EST. PATIENT-LVL IV: CPT | Mod: PBBFAC,,, | Performed by: NURSE PRACTITIONER

## 2018-03-19 PROCEDURE — G0439 PPPS, SUBSEQ VISIT: HCPCS | Mod: S$GLB,,, | Performed by: NURSE PRACTITIONER

## 2018-03-19 RX ORDER — TIZANIDINE 4 MG/1
TABLET ORAL
COMMUNITY
Start: 2018-02-23 | End: 2018-11-07 | Stop reason: SDUPTHER

## 2018-03-19 NOTE — PATIENT INSTRUCTIONS
Counseling and Referral of Other Preventative  (Italic type indicates deductible and co-insurance are waived)    Patient Name: Juan F Head  Today's Date: 3/19/2018    Health Maintenance       Date Due Completion Date    Colonoscopy 04/10/2013 4/10/2003 (Done)    Override on 4/10/2003: Done (per [facility] records no recommendation noted.)    PROSTATE-SPECIFIC ANTIGEN 03/29/2018 3/29/2017    Lipid Panel 03/29/2018 3/29/2017    TETANUS VACCINE 09/12/2027 9/12/2017        No orders of the defined types were placed in this encounter.    The following information is provided to all patients.  This information is to help you find resources for any of the problems found today that may be affecting your health:                Living healthy guide: www.[facility].[place].gov      Understanding Diabetes: www.diabetes.org      Eating healthy: www.cdc.gov/healthyweight      [facility] home safety checklist: www.cdc.gov/steadi/patient.html      Agency on Aging: www.goea.[place].gov      Alcoholics anonymous (AA): www.aa.org      Physical Activity: www.darren.nih.gov/ba6bkgy      Tobacco use: www.quitwithusla.org

## 2018-03-19 NOTE — PROGRESS NOTES
"Juan F Flores presented for a  Medicare AWV and comprehensive Health Risk Assessment today. The following components were reviewed and updated:    · Medical history  · Family History  · Social history  · Allergies and Current Medications  · Health Risk Assessment  · Health Maintenance  · Care Team     ** See Completed Assessments for Annual Wellness Visit within the encounter summary.**       The following assessments were completed:  · Living Situation  · CAGE  · Depression Screening  · Timed Get Up and Go  · Whisper Test  · Cognitive Function Screening  · Nutrition Screening  · ADL Screening  · PAQ Screening    Vitals:    03/19/18 1257   BP: 118/64   BP Location: Left arm   Pulse: 70   Temp: 98.1 °F (36.7 °C)   TempSrc: Tympanic   SpO2: 95%   Weight: 104.9 kg (231 lb 4.2 oz)   Height: 6' 3" (1.905 m)     Body mass index is 28.91 kg/m².  Physical Exam   Constitutional: He is oriented to person, place, and time. He appears well-developed and well-nourished. No distress.   HENT:   Head: Normocephalic and atraumatic.   Nose: Nose normal.   Mouth/Throat: Oropharynx is clear and moist. No oropharyngeal exudate.   Eyes: Conjunctivae are normal.   Neck: Normal range of motion. Neck supple. No JVD present. No tracheal deviation present. No thyromegaly present.   No carotid bruits   Cardiovascular: Normal rate, regular rhythm, normal heart sounds and intact distal pulses.  Exam reveals no gallop and no friction rub.    No murmur heard.  Pulmonary/Chest: Effort normal and breath sounds normal. No respiratory distress. He has no wheezes. He has no rales. He exhibits no tenderness.   Abdominal: Soft. Bowel sounds are normal. He exhibits no distension and no mass. There is no tenderness. There is no rebound and no guarding. No hernia.   No abd bruits   Musculoskeletal: He exhibits no edema or tenderness.   Guarded gait  Limited ROM   Lymphadenopathy:     He has no cervical adenopathy.   Neurological: He is alert and oriented to " person, place, and time. No cranial nerve deficit.   Skin: Skin is warm and dry. He is not diaphoretic.   Psychiatric: He has a normal mood and affect. His behavior is normal.         Diagnoses and health risks identified today and associated recommendations/orders:    1. Encounter for preventive health examination  Screenings performed, as noted above.  Personal preventative testing needs reviewed.     2. Chronic midline low back pain without sciatica  Monitored/treated on meds, continue the same tx, stable, sees Dr Fermin Carrillo monthly    3. Bipolar depression  Monitored/treated on meds, continue the same tx, stable    4. Recurrent major depressive disorder, in partial remission  Monitored/treated on meds, continue the same tx, stable    5. Tortuous aorta  Monitored/treated on meds, continue the same tx, stable, CXR 2011    6. Hypogonadism male  Monitored/treated on meds, continue the same tx, stable,  Will discuss resuming testosterone treatment with pcp    7. Anxiety  Monitored/treated on meds, continue the same tx, stable    8. Osteoarthritis, unspecified osteoarthritis type, unspecified site  Monitored/treated on meds, continue the same tx, stable    9. Irritable bowel syndrome, unspecified type  Monitored/treated on meds, continue the same tx, stable,        Provided Juan F with a 5-10 year written screening schedule and personal prevention plan. Recommendations were developed using the USPSTF age appropriate recommendations. Education, counseling, and referrals were provided as needed. After Visit Summary printed and given to patient which includes a list of additional screenings\tests needed.    No Follow-up on file.    Garret Daniel NP

## 2018-03-19 NOTE — PROGRESS NOTES
"Subjective:      Patient ID: Juan F CORDOVA Head is a 63 y.o. male.    Chief Complaint: HRA    HPI:    Past Medical History:   Diagnosis Date    Anxiety     Bipolar disorder     Chronic LBP     Depression     Difficulty urinating     DU (duodenal ulcer)     Hypogonadism male     Irritable bowel syndrome        Past Surgical History:   Procedure Laterality Date    BACK SURGERY      BACK SURGERY      hernia      HERNIA REPAIR      LUMBAR FUSION      lyph node biopsy         Lab Results   Component Value Date    WBC 6.64 10/16/2017    HGB 17.8 10/16/2017    HCT 53.6 10/16/2017     10/16/2017    CHOL 197 03/29/2017    TRIG 184 (H) 03/29/2017    HDL 41 03/29/2017    ALT 21 10/16/2017    AST 20 10/16/2017     10/16/2017    K 4.8 10/16/2017     10/16/2017    CREATININE 1.0 10/16/2017    BUN 12 10/16/2017    CO2 30 (H) 10/16/2017    TSH 2.003 03/29/2017    PSA 0.39 03/29/2017    INR 1.0 09/27/2016       /64 (BP Location: Left arm)   Pulse 70   Temp 98.1 °F (36.7 °C) (Tympanic)   Ht 6' 3" (1.905 m)   Wt 104.9 kg (231 lb 4.2 oz)   SpO2 95%   BMI 28.91 kg/m²       Review of Systems   Objective:     Physical Exam  Assessment:      1. Encounter for preventive health examination    2. Chronic midline low back pain without sciatica    3. Bipolar depression    4. Recurrent major depressive disorder, in partial remission    5. Tortuous aorta    6. Hypogonadism male    7. Anxiety    8. Osteoarthritis, unspecified osteoarthritis type, unspecified site    9. Irritable bowel syndrome, unspecified type      Plan:   Encounter for preventive health examination    Chronic midline low back pain without sciatica    Bipolar depression    Recurrent major depressive disorder, in partial remission    Tortuous aorta    Hypogonadism male    Anxiety    Osteoarthritis, unspecified osteoarthritis type, unspecified site    Irritable bowel syndrome, unspecified type          Current Outpatient Prescriptions:     " "clonazepam (KLONOPIN) 1 MG tablet, 3 mg. 1.5 tablet Oral At bedtime, Disp: , Rfl:     esomeprazole (NEXIUM) 40 MG capsule, Take 1 capsule (40 mg total) by mouth before breakfast., Disp: 30 capsule, Rfl: 11    gabapentin (NEURONTIN) 800 MG tablet, , Disp: , Rfl: 0    needle, disp, 18 G (HYPODERMIC NEEDLES) 18 gauge x 1 1/2" Ndle, Use the 18 G x 1 1/2" needle to with draw medication from vial., Disp: 20 each, Rfl: 1    needle, disp, 21 G (HYPODERMIC NEEDLES) 21 gauge x 1 1/2" Ndle, Use the 21 G 21 x 1 1/2 needle to inject medication into deep muscle., Disp: 20 each, Rfl: 1    oxycodone-acetaminophen  mg (PERCOCET)  mg per tablet, Take 1 tablet by mouth every 4 (four) hours as needed., Disp: , Rfl:     syringe with needle (SYRINGE 3CC/84RV6-3/2") 3 mL 21 x 1 1/2" Syrg, Use for injections, Disp: 10 Syringe, Rfl: 1    syringe, disposable, 3 mL Syrg, As needed, Disp: 20 each, Rfl: 1    trazodone (DESYREL) 150 MG tablet, , Disp: , Rfl: 0    ziprasidone (GEODON) 40 MG Cap, Take 80 mg by mouth once daily. take with food, Disp: , Rfl: 0    tiZANidine (ZANAFLEX) 4 MG tablet, , Disp: , Rfl:   "

## 2018-03-20 ENCOUNTER — PATIENT OUTREACH (OUTPATIENT)
Dept: ADMINISTRATIVE | Facility: HOSPITAL | Age: 63
End: 2018-03-20

## 2018-03-20 ENCOUNTER — OFFICE VISIT (OUTPATIENT)
Dept: INTERNAL MEDICINE | Facility: CLINIC | Age: 63
End: 2018-03-20
Payer: MEDICARE

## 2018-03-20 VITALS
TEMPERATURE: 96 F | DIASTOLIC BLOOD PRESSURE: 68 MMHG | HEART RATE: 60 BPM | WEIGHT: 231.25 LBS | HEIGHT: 75 IN | SYSTOLIC BLOOD PRESSURE: 110 MMHG | OXYGEN SATURATION: 94 % | BODY MASS INDEX: 28.75 KG/M2

## 2018-03-20 DIAGNOSIS — F41.9 ANXIETY: ICD-10-CM

## 2018-03-20 DIAGNOSIS — Z12.5 SCREENING PSA (PROSTATE SPECIFIC ANTIGEN): ICD-10-CM

## 2018-03-20 DIAGNOSIS — Z00.00 ENCOUNTER FOR MEDICAL EXAMINATION TO ESTABLISH CARE: Primary | ICD-10-CM

## 2018-03-20 DIAGNOSIS — E66.3 OVERWEIGHT: ICD-10-CM

## 2018-03-20 DIAGNOSIS — Z79.899 ENCOUNTER FOR LONG-TERM CURRENT USE OF MEDICATION: ICD-10-CM

## 2018-03-20 DIAGNOSIS — Z12.11 SCREENING FOR COLON CANCER: ICD-10-CM

## 2018-03-20 DIAGNOSIS — G89.29 CHRONIC MIDLINE LOW BACK PAIN WITHOUT SCIATICA: ICD-10-CM

## 2018-03-20 DIAGNOSIS — E29.1 HYPOGONADISM MALE: ICD-10-CM

## 2018-03-20 DIAGNOSIS — M54.50 CHRONIC MIDLINE LOW BACK PAIN WITHOUT SCIATICA: ICD-10-CM

## 2018-03-20 DIAGNOSIS — Z13.220 SCREENING FOR HYPERLIPIDEMIA: ICD-10-CM

## 2018-03-20 PROCEDURE — 99999 PR PBB SHADOW E&M-EST. PATIENT-LVL III: CPT | Mod: PBBFAC,,, | Performed by: FAMILY MEDICINE

## 2018-03-20 PROCEDURE — 99396 PREV VISIT EST AGE 40-64: CPT | Mod: S$GLB,,, | Performed by: FAMILY MEDICINE

## 2018-03-20 NOTE — PROGRESS NOTES
Subjective:       Patient ID: Juan F Flores is a 63 y.o. male.    Chief Complaint: Establish Care    HPI     64 yo M    PMH   Low back pain - Dr. Fermin Carrillo  Bipolar depression - neuropsychologist - Dr. Garcia  HypoT  Anxiety  OA  IBS  H/O DVT - was on anti-coagulation for 5 years.     Non-smoker - 6 months while college  None alcohol  No drug    No longer working  Retired -  for [employer]    Presents to establish care and discuss Test use.  Finds without T supplementation has no sex drive, low energy level, and weight maintence.    5 mo ago it was 1224          Review of Systems   Constitutional: Negative for chills and fever.   HENT: Negative for trouble swallowing.    Eyes: Negative for visual disturbance.   Respiratory: Negative for shortness of breath.    Cardiovascular: Negative for chest pain.   Gastrointestinal: Positive for constipation. Negative for diarrhea.   Genitourinary: Negative for difficulty urinating.   Musculoskeletal: Negative for gait problem.   Skin: Negative for rash.   Neurological: Negative for dizziness and light-headedness.   Psychiatric/Behavioral: Negative for dysphoric mood.       Objective:       Vitals:    03/20/18 1134   BP: 110/68   Pulse: 60   Temp: (!) 95.8 °F (35.4 °C)       Physical Exam   Constitutional: He is oriented to person, place, and time. He appears well-developed and well-nourished. No distress.   HENT:   Head: Normocephalic and atraumatic.   Right Ear: Hearing, tympanic membrane, external ear and ear canal normal.   Left Ear: Hearing, tympanic membrane, external ear and ear canal normal.   Nose: Nose normal. Right sinus exhibits no maxillary sinus tenderness and no frontal sinus tenderness. Left sinus exhibits no maxillary sinus tenderness and no frontal sinus tenderness.   Mouth/Throat: Uvula is midline, oropharynx is clear and moist and mucous membranes are normal.   Eyes: Conjunctivae are normal. Right eye exhibits no discharge. Left eye  exhibits no discharge.   Neck: Trachea normal, normal range of motion and full passive range of motion without pain.   Cardiovascular: Normal rate, regular rhythm, normal heart sounds and intact distal pulses.    Pulmonary/Chest: Effort normal and breath sounds normal. No respiratory distress. He has no decreased breath sounds. He has no wheezes.   Abdominal: Soft. Normal appearance and bowel sounds are normal. He exhibits no distension and no mass. There is no tenderness. There is no guarding. No hernia.   Musculoskeletal: Normal range of motion. He exhibits no edema or deformity.   Lymphadenopathy:     He has no cervical adenopathy.   Neurological: He is alert and oriented to person, place, and time.   Skin: Skin is warm, dry and intact. No rash noted. No erythema. No pallor.   Psychiatric: He has a normal mood and affect. His speech is normal and behavior is normal. Thought content normal.       Assessment:       1. Encounter for medical examination to establish care    2. Hypogonadism male    3. Chronic midline low back pain without sciatica    4. Anxiety    5. Screening for colon cancer    6. Screening for hyperlipidemia    7. Encounter for long-term current use of medication        Plan:   Encounter for medical examination to establish care      Hypogonadism male  Previously on T  Will check T panel  Consider restarting - as he feels much improved.    Chronic midline low back pain without sciatica  Follows pain mngt  Discussed opioid likely cause of constipation and low T    Anxiety  Bipolar  Follows psych  Continue current meds.    Screening colon cancer Fit Kit  Screening Lipid panel     Noted Hgb/hct elevated when T was supra therapeutic  Will check CBC prior to onset of T      F/U pending labs.  Or 6 months.            No Follow-up on file.

## 2018-03-21 ENCOUNTER — APPOINTMENT (OUTPATIENT)
Dept: LAB | Facility: HOSPITAL | Age: 63
End: 2018-03-21
Attending: FAMILY MEDICINE
Payer: MEDICARE

## 2018-03-21 ENCOUNTER — PATIENT MESSAGE (OUTPATIENT)
Dept: INTERNAL MEDICINE | Facility: CLINIC | Age: 63
End: 2018-03-21

## 2018-03-21 DIAGNOSIS — E29.1 HYPOGONADISM MALE: ICD-10-CM

## 2018-03-21 DIAGNOSIS — Z79.899 ENCOUNTER FOR LONG-TERM CURRENT USE OF MEDICATION: ICD-10-CM

## 2018-03-21 DIAGNOSIS — Z13.220 SCREENING FOR HYPERLIPIDEMIA: ICD-10-CM

## 2018-03-21 DIAGNOSIS — Z12.5 SCREENING PSA (PROSTATE SPECIFIC ANTIGEN): ICD-10-CM

## 2018-03-21 DIAGNOSIS — E66.3 OVERWEIGHT: ICD-10-CM

## 2018-03-21 LAB
ALBUMIN SERPL BCP-MCNC: 3.9 G/DL
ALP SERPL-CCNC: 93 U/L
ALT SERPL W/O P-5'-P-CCNC: 20 U/L
ANION GAP SERPL CALC-SCNC: 10 MMOL/L
AST SERPL-CCNC: 24 U/L
BASOPHILS # BLD AUTO: 0.03 K/UL
BASOPHILS NFR BLD: 0.5 %
BILIRUB SERPL-MCNC: 0.5 MG/DL
BUN SERPL-MCNC: 17 MG/DL
CALCIUM SERPL-MCNC: 9.7 MG/DL
CHLORIDE SERPL-SCNC: 105 MMOL/L
CHOLEST SERPL-MCNC: 184 MG/DL
CHOLEST/HDLC SERPL: 4.1 {RATIO}
CO2 SERPL-SCNC: 27 MMOL/L
COMPLEXED PSA SERPL-MCNC: 0.34 NG/ML
CREAT SERPL-MCNC: 1 MG/DL
DIFFERENTIAL METHOD: ABNORMAL
EOSINOPHIL # BLD AUTO: 0.2 K/UL
EOSINOPHIL NFR BLD: 3.4 %
ERYTHROCYTE [DISTWIDTH] IN BLOOD BY AUTOMATED COUNT: 11.8 %
EST. GFR  (AFRICAN AMERICAN): >60 ML/MIN/1.73 M^2
EST. GFR  (NON AFRICAN AMERICAN): >60 ML/MIN/1.73 M^2
GLUCOSE SERPL-MCNC: 87 MG/DL
HCT VFR BLD AUTO: 48.4 %
HDLC SERPL-MCNC: 45 MG/DL
HDLC SERPL: 24.5 %
HGB BLD-MCNC: 16.4 G/DL
IMM GRANULOCYTES # BLD AUTO: 0.01 K/UL
IMM GRANULOCYTES NFR BLD AUTO: 0.2 %
LDLC SERPL CALC-MCNC: 108.6 MG/DL
LYMPHOCYTES # BLD AUTO: 1.7 K/UL
LYMPHOCYTES NFR BLD: 30.7 %
MCH RBC QN AUTO: 31.4 PG
MCHC RBC AUTO-ENTMCNC: 33.9 G/DL
MCV RBC AUTO: 93 FL
MONOCYTES # BLD AUTO: 0.3 K/UL
MONOCYTES NFR BLD: 6 %
NEUTROPHILS # BLD AUTO: 3.3 K/UL
NEUTROPHILS NFR BLD: 59.2 %
NONHDLC SERPL-MCNC: 139 MG/DL
NRBC BLD-RTO: 0 /100 WBC
PLATELET # BLD AUTO: 156 K/UL
PMV BLD AUTO: 11.8 FL
POTASSIUM SERPL-SCNC: 4.9 MMOL/L
PROT SERPL-MCNC: 6.8 G/DL
RBC # BLD AUTO: 5.23 M/UL
SODIUM SERPL-SCNC: 142 MMOL/L
TRIGL SERPL-MCNC: 152 MG/DL
WBC # BLD AUTO: 5.54 K/UL

## 2018-03-21 PROCEDURE — 80053 COMPREHEN METABOLIC PANEL: CPT

## 2018-03-21 PROCEDURE — 85025 COMPLETE CBC W/AUTO DIFF WBC: CPT

## 2018-03-21 PROCEDURE — 80061 LIPID PANEL: CPT

## 2018-03-21 PROCEDURE — 84153 ASSAY OF PSA TOTAL: CPT

## 2018-03-21 PROCEDURE — 36415 COLL VENOUS BLD VENIPUNCTURE: CPT

## 2018-03-21 PROCEDURE — 82040 ASSAY OF SERUM ALBUMIN: CPT

## 2018-03-25 LAB
ALBUMIN SERPL-MCNC: 4.2 G/DL (ref 3.6–5.1)
SHBG SERPL-SCNC: 89 NMOL/L (ref 22–77)
TESTOST FREE SERPL-MCNC: 27.9 PG/ML (ref 46–224)
TESTOST SERPL-MCNC: 506 NG/DL (ref 250–1100)
TESTOSTERONE.FREE+WB SERPL-MCNC: 53.7 NG/DL (ref 110–575)

## 2018-09-20 ENCOUNTER — OFFICE VISIT (OUTPATIENT)
Dept: INTERNAL MEDICINE | Facility: CLINIC | Age: 63
End: 2018-09-20
Payer: MEDICARE

## 2018-09-20 VITALS
WEIGHT: 221.81 LBS | RESPIRATION RATE: 20 BRPM | TEMPERATURE: 98 F | OXYGEN SATURATION: 98 % | DIASTOLIC BLOOD PRESSURE: 64 MMHG | SYSTOLIC BLOOD PRESSURE: 106 MMHG | BODY MASS INDEX: 27.58 KG/M2 | HEIGHT: 75 IN | HEART RATE: 60 BPM

## 2018-09-20 DIAGNOSIS — F31.9 BIPOLAR DEPRESSION: ICD-10-CM

## 2018-09-20 DIAGNOSIS — Z79.899 HIGH RISK MEDICATION USE: ICD-10-CM

## 2018-09-20 DIAGNOSIS — Z23 IMMUNIZATION DUE: Primary | ICD-10-CM

## 2018-09-20 DIAGNOSIS — F41.9 ANXIETY: ICD-10-CM

## 2018-09-20 PROCEDURE — 3008F BODY MASS INDEX DOCD: CPT | Mod: CPTII,,, | Performed by: FAMILY MEDICINE

## 2018-09-20 PROCEDURE — 99999 PR PBB SHADOW E&M-EST. PATIENT-LVL IV: CPT | Mod: PBBFAC,,, | Performed by: FAMILY MEDICINE

## 2018-09-20 PROCEDURE — 90662 IIV NO PRSV INCREASED AG IM: CPT | Mod: PBBFAC

## 2018-09-20 PROCEDURE — 99214 OFFICE O/P EST MOD 30 MIN: CPT | Mod: PBBFAC,25 | Performed by: FAMILY MEDICINE

## 2018-09-20 PROCEDURE — 99213 OFFICE O/P EST LOW 20 MIN: CPT | Mod: S$PBB,,, | Performed by: FAMILY MEDICINE

## 2018-09-20 NOTE — PROGRESS NOTES
Subjective:       Patient ID: Juan F Flores is a 63 y.o. male.    Chief Complaint: Follow-up (6mo f/u)    HPI     63    Low back pain - Dr. Fermin Carrillo - Still taking percocet  Chronic foot pain - failed plantar fasciotomy.  Bipolar depression - neuropsychologist - Dr. Garcia  Anxiety  High Risk Medication  HypoT  OA  IBS  H/O DVT - was on anti-coagulation for 5 years.   GERD    Presents for follow - up    Outside pain from back -   Doing well    Has lost 10 lbs  Dietary improvement.          Review of Systems   Constitutional: Negative for activity change and unexpected weight change.   HENT: Negative for hearing loss, rhinorrhea and trouble swallowing.    Eyes: Negative for discharge and visual disturbance.   Respiratory: Negative for chest tightness and wheezing.    Cardiovascular: Negative for chest pain and palpitations.   Gastrointestinal: Positive for constipation. Negative for blood in stool, diarrhea and vomiting.   Endocrine: Negative for polydipsia and polyuria.   Genitourinary: Negative for difficulty urinating, hematuria and urgency.   Musculoskeletal: Negative for arthralgias, joint swelling and neck pain.   Neurological: Negative for weakness and headaches.   Psychiatric/Behavioral: Positive for dysphoric mood. Negative for confusion.       Objective:       Vitals:    09/20/18 1142   BP: 106/64   Pulse: 60   Resp: 20   Temp: 97.8 °F (36.6 °C)       Physical Exam   Constitutional: He is oriented to person, place, and time. He appears well-developed and well-nourished. No distress.   HENT:   Head: Normocephalic and atraumatic.   Right Ear: Hearing, tympanic membrane, external ear and ear canal normal.   Left Ear: Hearing, tympanic membrane, external ear and ear canal normal.   Nose: Nose normal. Right sinus exhibits no maxillary sinus tenderness and no frontal sinus tenderness. Left sinus exhibits no maxillary sinus tenderness and no frontal sinus tenderness.   Mouth/Throat: Uvula is midline, oropharynx  is clear and moist and mucous membranes are normal.   Eyes: Conjunctivae are normal. Right eye exhibits no discharge. Left eye exhibits no discharge.   Neck: Trachea normal, normal range of motion and full passive range of motion without pain.   Cardiovascular: Normal rate, regular rhythm, normal heart sounds and intact distal pulses.   Pulmonary/Chest: Effort normal and breath sounds normal. No respiratory distress. He has no decreased breath sounds. He has no wheezes.   Abdominal: Soft. Normal appearance and bowel sounds are normal. He exhibits no distension and no mass. There is no tenderness. There is no guarding. No hernia.   Musculoskeletal: Normal range of motion. He exhibits no edema or deformity.   Lymphadenopathy:     He has no cervical adenopathy.   Neurological: He is alert and oriented to person, place, and time.   Skin: Skin is warm, dry and intact. No rash noted. No erythema. No pallor.   Psychiatric: He has a normal mood and affect. His speech is normal and behavior is normal. Thought content normal.       Assessment:       1. Immunization due    2. High risk medication use    3. Anxiety    4. Bipolar depression        Plan:   Immunization due    High risk medication use    Discussed my concern of meds  - benzo and opioid  He is well aware  Cbc  cmp  lipid    Anxiety  Bipolar depression    Controlled  Following pyschiatry      Flu Shot  Pneumo Shot    Back Pain  Addendum:  Post visit recognition of back pain.  Patient reports history of wedging at T6-T10, bulging disc at L2-3, herniated disc at L5-S1, plus severe degenerative disc disease.  Chronic pain issue - impacting quality of life.   Following Pain management currently. Dr. RAMONA Carrillo.    F/u in 6 months    Standing labs for fasting  No Follow-up on file.

## 2018-09-21 ENCOUNTER — LAB VISIT (OUTPATIENT)
Dept: LAB | Facility: HOSPITAL | Age: 63
End: 2018-09-21
Payer: MEDICARE

## 2018-09-21 DIAGNOSIS — Z79.899 HIGH RISK MEDICATION USE: ICD-10-CM

## 2018-09-21 LAB
25(OH)D3+25(OH)D2 SERPL-MCNC: 25 NG/ML
ALBUMIN SERPL BCP-MCNC: 3.8 G/DL
ALP SERPL-CCNC: 95 U/L
ALT SERPL W/O P-5'-P-CCNC: 17 U/L
ANION GAP SERPL CALC-SCNC: 8 MMOL/L
AST SERPL-CCNC: 18 U/L
BASOPHILS # BLD AUTO: 0.02 K/UL
BASOPHILS NFR BLD: 0.3 %
BILIRUB SERPL-MCNC: 0.2 MG/DL
BUN SERPL-MCNC: 17 MG/DL
CALCIUM SERPL-MCNC: 9.2 MG/DL
CHLORIDE SERPL-SCNC: 107 MMOL/L
CHOLEST SERPL-MCNC: 195 MG/DL
CHOLEST/HDLC SERPL: 4.3 {RATIO}
CO2 SERPL-SCNC: 28 MMOL/L
CREAT SERPL-MCNC: 0.9 MG/DL
DIFFERENTIAL METHOD: ABNORMAL
EOSINOPHIL # BLD AUTO: 0.2 K/UL
EOSINOPHIL NFR BLD: 2.2 %
ERYTHROCYTE [DISTWIDTH] IN BLOOD BY AUTOMATED COUNT: 11.9 %
EST. GFR  (AFRICAN AMERICAN): >60 ML/MIN/1.73 M^2
EST. GFR  (NON AFRICAN AMERICAN): >60 ML/MIN/1.73 M^2
GLUCOSE SERPL-MCNC: 88 MG/DL
HCT VFR BLD AUTO: 45.9 %
HDLC SERPL-MCNC: 45 MG/DL
HDLC SERPL: 23.1 %
HGB BLD-MCNC: 15.5 G/DL
IMM GRANULOCYTES # BLD AUTO: 0.02 K/UL
IMM GRANULOCYTES NFR BLD AUTO: 0.3 %
LDLC SERPL CALC-MCNC: 129.8 MG/DL
LYMPHOCYTES # BLD AUTO: 1.9 K/UL
LYMPHOCYTES NFR BLD: 23.6 %
MCH RBC QN AUTO: 31.4 PG
MCHC RBC AUTO-ENTMCNC: 33.8 G/DL
MCV RBC AUTO: 93 FL
MONOCYTES # BLD AUTO: 0.5 K/UL
MONOCYTES NFR BLD: 6.9 %
NEUTROPHILS # BLD AUTO: 5.3 K/UL
NEUTROPHILS NFR BLD: 66.7 %
NONHDLC SERPL-MCNC: 150 MG/DL
NRBC BLD-RTO: 0 /100 WBC
PLATELET # BLD AUTO: 159 K/UL
PMV BLD AUTO: 11.8 FL
POTASSIUM SERPL-SCNC: 4.4 MMOL/L
PROT SERPL-MCNC: 6.7 G/DL
RBC # BLD AUTO: 4.93 M/UL
SODIUM SERPL-SCNC: 143 MMOL/L
TRIGL SERPL-MCNC: 101 MG/DL
WBC # BLD AUTO: 7.88 K/UL

## 2018-09-21 PROCEDURE — 85025 COMPLETE CBC W/AUTO DIFF WBC: CPT

## 2018-09-21 PROCEDURE — 36415 COLL VENOUS BLD VENIPUNCTURE: CPT

## 2018-09-21 PROCEDURE — 80053 COMPREHEN METABOLIC PANEL: CPT

## 2018-09-21 PROCEDURE — 80061 LIPID PANEL: CPT

## 2018-09-21 PROCEDURE — 82306 VITAMIN D 25 HYDROXY: CPT

## 2018-09-22 ENCOUNTER — PATIENT MESSAGE (OUTPATIENT)
Dept: INTERNAL MEDICINE | Facility: CLINIC | Age: 63
End: 2018-09-22

## 2018-09-24 ENCOUNTER — TELEPHONE (OUTPATIENT)
Dept: INTERNAL MEDICINE | Facility: CLINIC | Age: 63
End: 2018-09-24

## 2018-09-24 NOTE — TELEPHONE ENCOUNTER
----- Message from Marcus Davis MD sent at 9/24/2018  1:23 PM CDT -----  Please call the patient regarding his labs  Vit D slightly low  Encouraged daily use of 1000 units Vit D over the counter.   All other labs look good. No acute concerns.

## 2018-11-07 ENCOUNTER — OFFICE VISIT (OUTPATIENT)
Dept: INTERNAL MEDICINE | Facility: CLINIC | Age: 63
End: 2018-11-07
Payer: MEDICARE

## 2018-11-07 VITALS
TEMPERATURE: 99 F | WEIGHT: 221.56 LBS | SYSTOLIC BLOOD PRESSURE: 140 MMHG | HEIGHT: 75 IN | RESPIRATION RATE: 16 BRPM | BODY MASS INDEX: 27.55 KG/M2 | OXYGEN SATURATION: 97 % | DIASTOLIC BLOOD PRESSURE: 80 MMHG | HEART RATE: 78 BPM

## 2018-11-07 DIAGNOSIS — K21.9 GASTROESOPHAGEAL REFLUX DISEASE, ESOPHAGITIS PRESENCE NOT SPECIFIED: ICD-10-CM

## 2018-11-07 DIAGNOSIS — M54.2 NECK PAIN: Primary | ICD-10-CM

## 2018-11-07 PROCEDURE — 99213 OFFICE O/P EST LOW 20 MIN: CPT | Mod: HCNC,S$GLB,, | Performed by: NURSE PRACTITIONER

## 2018-11-07 PROCEDURE — 3008F BODY MASS INDEX DOCD: CPT | Mod: CPTII,HCNC,S$GLB, | Performed by: NURSE PRACTITIONER

## 2018-11-07 PROCEDURE — 99999 PR PBB SHADOW E&M-EST. PATIENT-LVL IV: CPT | Mod: PBBFAC,HCNC,, | Performed by: NURSE PRACTITIONER

## 2018-11-07 RX ORDER — ESOMEPRAZOLE MAGNESIUM 40 MG/1
40 CAPSULE, DELAYED RELEASE ORAL
Qty: 90 CAPSULE | Refills: 3 | Status: SHIPPED | OUTPATIENT
Start: 2018-11-07 | End: 2019-11-07

## 2018-11-07 RX ORDER — CYCLOBENZAPRINE HCL 10 MG
10 TABLET ORAL 3 TIMES DAILY PRN
Qty: 60 TABLET | Refills: 0 | Status: SHIPPED | OUTPATIENT
Start: 2018-11-07 | End: 2018-11-07

## 2018-11-07 RX ORDER — TIZANIDINE 4 MG/1
4 TABLET ORAL EVERY 8 HOURS
Qty: 60 TABLET | Refills: 1 | Status: SHIPPED | OUTPATIENT
Start: 2018-11-07

## 2018-11-07 NOTE — PROGRESS NOTES
"Subjective:      Patient ID: Juan F CORDOVA Head is a 63 y.o. male.    Chief Complaint: sore neck and back    HPI:  Patient has a hx of neck and back pain.  Says he is scheduled to see Dr Hoyt on 11/13 for possible surgery.  He says he has one bulging disc in his neck.  Says he slept wrong, strained his neck, has continued pain that is not shooting down his arms.  Is asking for a refill of Zanaflex, has used it recently and found it to be helpful.  He would also like to try PT.  Needs a refill of GERD med    Past Medical History:   Diagnosis Date    Anxiety     Bipolar disorder     Chronic LBP     Depression     Difficulty urinating     DU (duodenal ulcer)     Hypogonadism male     Irritable bowel syndrome     Major depression in partial remission        Past Surgical History:   Procedure Laterality Date    BACK SURGERY      BACK SURGERY      hernia      HERNIA REPAIR      LUMBAR FUSION      lyph node biopsy         Lab Results   Component Value Date    WBC 7.88 09/21/2018    HGB 15.5 09/21/2018    HCT 45.9 09/21/2018     09/21/2018    CHOL 195 09/21/2018    TRIG 101 09/21/2018    HDL 45 09/21/2018    ALT 17 09/21/2018    AST 18 09/21/2018     09/21/2018    K 4.4 09/21/2018     09/21/2018    CREATININE 0.9 09/21/2018    BUN 17 09/21/2018    CO2 28 09/21/2018    TSH 2.003 03/29/2017    PSA 0.34 03/21/2018    INR 1.0 09/27/2016       BP (!) 140/80   Pulse 78   Temp 98.7 °F (37.1 °C)   Resp 16   Ht 6' 3" (1.905 m)   Wt 100.5 kg (221 lb 9 oz)   SpO2 97%   BMI 27.69 kg/m²       Review of Systems   Constitutional: Negative for appetite change, chills, diaphoresis and fever.   HENT: Negative for congestion, ear pain, postnasal drip, rhinorrhea, sneezing, sore throat and trouble swallowing.    Eyes: Negative for photophobia, pain and visual disturbance.   Respiratory: Negative for apnea, cough, choking, chest tightness, shortness of breath and wheezing.    Cardiovascular: Negative for " "chest pain, palpitations and leg swelling.   Gastrointestinal: Negative for abdominal pain, constipation, diarrhea, nausea and vomiting.   Genitourinary: Negative for decreased urine volume, difficulty urinating, dysuria, hematuria and urgency.   Musculoskeletal: Positive for arthralgias and myalgias. Negative for gait problem and joint swelling.   Skin: Negative for rash.   Neurological: Negative for dizziness, tremors, seizures, syncope, weakness, light-headedness, numbness and headaches.   Psychiatric/Behavioral: Negative for agitation, confusion, decreased concentration, hallucinations and sleep disturbance. The patient is not nervous/anxious.       Objective:     Physical Exam   Constitutional: He is oriented to person, place, and time. He appears well-developed and well-nourished. No distress.   Musculoskeletal:   Normal gait  No cervical spinal TTP, very tight shoulders, decreased ROM   Neurological: He is alert and oriented to person, place, and time.   Skin: Skin is warm and dry.   Psychiatric: He has a normal mood and affect. His behavior is normal.     Assessment:      1. Neck pain      Plan:   Neck pain  -     Ambulatory Referral to Physical/Occupational Therapy    Other orders  -     tiZANidine (ZANAFLEX) 4 MG tablet; Take 1 tablet (4 mg total) by mouth every 8 (eight) hours.  Dispense: 60 tablet; Refill: 1  -     esomeprazole (NEXIUM) 40 MG capsule; Take 1 capsule (40 mg total) by mouth before breakfast.  Dispense: 90 capsule; Refill: 3    Can use Ibuprofen or Aleve for the neck pain also with food.  Consult faxed to his pt of choice, he will call them this afternoon to set up appt      Current Outpatient Medications:     clonazepam (KLONOPIN) 1 MG tablet, 1 mg. 1.5 tablet Oral At bedtime, Disp: , Rfl:     gabapentin (NEURONTIN) 800 MG tablet, , Disp: , Rfl: 0    needle, disp, 18 G (HYPODERMIC NEEDLES) 18 gauge x 1 1/2" Ndle, Use the 18 G x 1 1/2" needle to with draw medication from vial., Disp: 20 " "each, Rfl: 1    needle, disp, 21 G (HYPODERMIC NEEDLES) 21 gauge x 1 1/2" Ndle, Use the 21 G 21 x 1 1/2 needle to inject medication into deep muscle., Disp: 20 each, Rfl: 1    oxycodone-acetaminophen  mg (PERCOCET)  mg per tablet, Take 1 tablet by mouth every 4 (four) hours as needed., Disp: , Rfl:     pneumococcal 23-solitario ps vaccine (PNEUMOVAX 23) 25 mcg/0.5 mL, administered by [facility], Disp: 0.5 mL, Rfl: 0    syringe with needle (SYRINGE 3CC/46XN2-5/2") 3 mL 21 x 1 1/2" Syrg, Use for injections, Disp: 10 Syringe, Rfl: 1    syringe, disposable, 3 mL Syrg, As needed, Disp: 20 each, Rfl: 1    tiZANidine (ZANAFLEX) 4 MG tablet, Take 1 tablet (4 mg total) by mouth every 8 (eight) hours., Disp: 60 tablet, Rfl: 1    trazodone (DESYREL) 150 MG tablet, 300 mg. , Disp: , Rfl: 0    ziprasidone (GEODON) 40 MG Cap, Take 60 mg by mouth once daily. take with food, Disp: , Rfl: 0    esomeprazole (NEXIUM) 40 MG capsule, Take 1 capsule (40 mg total) by mouth before breakfast., Disp: 90 capsule, Rfl: 3  "

## 2018-12-10 ENCOUNTER — PATIENT MESSAGE (OUTPATIENT)
Dept: INTERNAL MEDICINE | Facility: CLINIC | Age: 63
End: 2018-12-10

## 2018-12-10 ENCOUNTER — TELEPHONE (OUTPATIENT)
Dept: INTERNAL MEDICINE | Facility: CLINIC | Age: 63
End: 2018-12-10

## 2018-12-10 DIAGNOSIS — M54.12 CERVICAL RADICULOPATHY: Primary | ICD-10-CM

## 2018-12-10 DIAGNOSIS — M54.2 NECK PAIN: Primary | ICD-10-CM

## 2018-12-10 NOTE — TELEPHONE ENCOUNTER
----- Message from Marcus Davis MD sent at 12/10/2018 12:55 PM CST -----  Please call and set up w pain mngt.  Consult place.d

## 2019-04-02 ENCOUNTER — PES CALL (OUTPATIENT)
Dept: ADMINISTRATIVE | Facility: CLINIC | Age: 64
End: 2019-04-02

## 2019-08-26 ENCOUNTER — PES CALL (OUTPATIENT)
Dept: ADMINISTRATIVE | Facility: CLINIC | Age: 64
End: 2019-08-26

## 2019-10-21 ENCOUNTER — PATIENT OUTREACH (OUTPATIENT)
Dept: ADMINISTRATIVE | Facility: HOSPITAL | Age: 64
End: 2019-10-21